# Patient Record
Sex: FEMALE | Race: AMERICAN INDIAN OR ALASKA NATIVE
[De-identification: names, ages, dates, MRNs, and addresses within clinical notes are randomized per-mention and may not be internally consistent; named-entity substitution may affect disease eponyms.]

---

## 2017-04-18 ENCOUNTER — HOSPITAL ENCOUNTER (EMERGENCY)
Dept: HOSPITAL 5 - ED | Age: 62
LOS: 1 days | Discharge: HOME | End: 2017-04-19
Payer: COMMERCIAL

## 2017-04-18 VITALS — SYSTOLIC BLOOD PRESSURE: 132 MMHG | DIASTOLIC BLOOD PRESSURE: 73 MMHG

## 2017-04-18 DIAGNOSIS — I10: ICD-10-CM

## 2017-04-18 DIAGNOSIS — J45.901: Primary | ICD-10-CM

## 2017-04-18 LAB
ANION GAP SERPL CALC-SCNC: 19 MMOL/L
BASOPHILS NFR BLD AUTO: 0.4 % (ref 0–1.8)
BUN SERPL-MCNC: 23 MG/DL (ref 7–17)
BUN/CREAT SERPL: 25.55 %
CALCIUM SERPL-MCNC: 8.6 MG/DL (ref 8.4–10.2)
CHLORIDE SERPL-SCNC: 101.9 MMOL/L (ref 98–107)
CO2 SERPL-SCNC: 21 MMOL/L (ref 22–30)
EOSINOPHIL NFR BLD AUTO: 2.4 % (ref 0–4.3)
GLUCOSE SERPL-MCNC: 140 MG/DL (ref 65–100)
HCT VFR BLD CALC: 40.3 % (ref 30.3–42.9)
HGB BLD-MCNC: 13.2 GM/DL (ref 10.1–14.3)
MCH RBC QN AUTO: 30 PG (ref 28–32)
MCHC RBC AUTO-ENTMCNC: 33 % (ref 30–34)
MCV RBC AUTO: 92 FL (ref 79–97)
PLATELET # BLD: 271 K/MM3 (ref 140–440)
POTASSIUM SERPL-SCNC: 3.9 MMOL/L (ref 3.6–5)
RBC # BLD AUTO: 4.38 M/MM3 (ref 3.65–5.03)
SODIUM SERPL-SCNC: 138 MMOL/L (ref 137–145)
WBC # BLD AUTO: 13 K/MM3 (ref 4.5–11)

## 2017-04-18 PROCEDURE — 84484 ASSAY OF TROPONIN QUANT: CPT

## 2017-04-18 PROCEDURE — 85025 COMPLETE CBC W/AUTO DIFF WBC: CPT

## 2017-04-18 PROCEDURE — 36415 COLL VENOUS BLD VENIPUNCTURE: CPT

## 2017-04-18 PROCEDURE — 80048 BASIC METABOLIC PNL TOTAL CA: CPT

## 2017-04-18 PROCEDURE — 94640 AIRWAY INHALATION TREATMENT: CPT

## 2017-04-18 PROCEDURE — 71020: CPT

## 2017-04-18 PROCEDURE — 99284 EMERGENCY DEPT VISIT MOD MDM: CPT

## 2017-04-18 RX ADMIN — IPRATROPIUM BROMIDE AND ALBUTEROL SULFATE ONE AMPUL: .5; 3 SOLUTION RESPIRATORY (INHALATION) at 23:16

## 2017-04-19 NOTE — EMERGENCY DEPARTMENT REPORT
ED Shortness of Breath HPI





- General


Chief Complaint: Dyspnea/Respdistress


Stated Complaint: ASTHMA


Time Seen by Provider: 04/18/17 22:33


Source: patient, EMS


Mode of arrival: Stretcher


Limitations: No Limitations





- History of Present Illness


Initial Comments: 





61-year-old female with history of asthma with prior intubation a few years ago 

presenting today because of shortness of breath.  Patient states that earlier 

today she started having a little bit of a cough and nasal congestion.  She 

took some of her inhaler but was not improving.  Called EMS who gave her 

steroids and albuterol with significant improvement.  By the time she is coming 

here she is minimally symptomatic.  No fevers or chills.  He sees a 

pulmonologist regularly.





- Related Data


 Previous Rx's











 Medication  Instructions  Recorded  Last Taken  Type


 


predniSONE [Deltasone] 50 mg PO QDAY #5 tab 04/19/17 Unknown Rx











 Allergies











Allergy/AdvReac Type Severity Reaction Status Date / Time


 


No Known Allergies Allergy   Verified 11/07/16 09:43














ED Review of Systems


ROS: 


Stated complaint: ASTHMA


Other details as noted in HPI





Comment: All other systems reviewed and negative


Constitutional: denies: chills, fever


ENT: congestion


Respiratory: cough


Cardiovascular: denies: chest pain


Gastrointestinal: denies: abdominal pain, nausea, vomiting


Genitourinary: denies: urgency, dysuria


Neurological: denies: headache


Psychiatric: denies: anxiety





ED Past Medical Hx





- Past Medical History


Previous Medical History?: Yes


Hx Hypertension: Yes


Hx Asthma: Yes





- Surgical History


Past Surgical History?: No





- Social History


Smoking Status: Never Smoker


Substance Use Type: None





- Medications


Home Medications: 


 Home Medications











 Medication  Instructions  Recorded  Confirmed  Last Taken  Type


 


predniSONE [Deltasone] 50 mg PO QDAY #5 tab 04/19/17  Unknown Rx














ED Physical Exam





- General


Limitations: No Limitations


General appearance: alert, in no apparent distress





- Head


Head exam: Present: atraumatic





- Eye


Eye exam: Present: normal appearance





- ENT


ENT exam: Present: normal exam





- Respiratory


Respiratory exam: Present: other (saturating 99% on room air, mild expiratory 

wheezing in all lung fields).  Absent: respiratory distress, stridor





- Cardiovascular


Cardiovascular Exam: Present: regular rate, normal rhythm





- GI/Abdominal


GI/Abdominal exam: Present: soft.  Absent: distended, tenderness





- Extremities Exam


Extremities exam: Present: normal inspection





- Neurological Exam


Neurological exam: Present: alert, oriented X3





- Psychiatric


Psychiatric exam: Present: normal affect





- Skin


Skin exam: Present: intact





ED Course


 Vital Signs











  04/18/17 04/18/17 04/18/17





  22:02 22:30 23:16


 


Temperature 98.4 F  


 


Pulse Rate 106 H  


 


Pulse Rate [   99 H





Anterior   





Bilateral   





Throughout]   


 


Respiratory 20 24 





Rate   


 


Respiratory   18





Rate [Anterior   





Bilateral   





Throughout]   


 


Blood Pressure 132/73  


 


O2 Sat by Pulse 98  





Oximetry   














  04/18/17





  23:30


 


Temperature 


 


Pulse Rate 


 


Pulse Rate [ 98 H





Anterior 





Bilateral 





Throughout] 


 


Respiratory 





Rate 


 


Respiratory 18





Rate [Anterior 





Bilateral 





Throughout] 


 


Blood Pressure 


 


O2 Sat by Pulse 





Oximetry 














ED Medical Decision Making





- Lab Data


Result diagrams: 


 04/18/17 22:40





 04/18/17 22:11





- Medical Decision Making





Chest x-ray, and labs reordered, realizes ordered





Chest x-ray does not show any clear infiltrate, labs unremarkable other than 

mild leukocytosis, mild dehydration


Symptoms significantly improved, no respiratory distress, patient saturating 

well on room air, stable for discharge


Critical care attestation.: 


If time is entered above; I have spent that time in minutes in the direct care 

of this critically ill patient, excluding procedure time.








ED Disposition


Clinical Impression: 


 Asthma exacerbation





Disposition: DISCHARGED TO HOME OR SELFCARE


Is pt being admited?: No


Does the pt Need Aspirin: No


Condition: Stable


Additional Instructions: 


Please follow up with the primary care doctor and pulmonologist in the next 3-5 

days.  Return to emergency room if your symptoms significantly worsen or 

develop new symptoms.


Prescriptions: 


predniSONE [Deltasone] 50 mg PO QDAY #5 tab


Referrals: 


PRIMARY CARE,MD [Primary Care Provider] - 3-5 Days


Time of Disposition: 00:12

## 2017-04-19 NOTE — XRAY REPORT
ROUTINE CHEST, TWO VIEWS:



History: Shortness of breath.

PA and lateral views demonstrate the heart and mediastinal

contour to be of normal size and shape.  The lungs are clear and fully 

expanded and the soft tissues and bony structures are normal.



IMPRESSION:

Normal study.